# Patient Record
Sex: FEMALE | Race: BLACK OR AFRICAN AMERICAN | NOT HISPANIC OR LATINO | URBAN - METROPOLITAN AREA
[De-identification: names, ages, dates, MRNs, and addresses within clinical notes are randomized per-mention and may not be internally consistent; named-entity substitution may affect disease eponyms.]

---

## 2023-09-05 PROBLEM — E78.5 HYPERLIPIDEMIA: Status: ACTIVE | Noted: 2023-09-05

## 2023-09-05 PROBLEM — E55.9 VITAMIN D DEFICIENCY: Status: ACTIVE | Noted: 2023-09-05

## 2023-09-05 PROBLEM — K21.9 GASTRO-ESOPHAGEAL REFLUX DISEASE WITHOUT ESOPHAGITIS: Status: ACTIVE | Noted: 2023-09-05

## 2023-09-05 PROBLEM — K90.9 MALABSORPTION (HHS-HCC): Status: ACTIVE | Noted: 2023-09-05

## 2023-09-05 PROBLEM — A64 STI (SEXUALLY TRANSMITTED INFECTION): Status: ACTIVE | Noted: 2023-09-05

## 2023-09-05 PROBLEM — M19.90 IDIOPATHIC OSTEOARTHRITIS: Status: ACTIVE | Noted: 2023-09-05

## 2023-09-05 PROBLEM — E11.9 TYPE 2 DIABETES MELLITUS WITHOUT COMPLICATION (MULTI): Status: ACTIVE | Noted: 2023-09-05

## 2023-09-05 PROBLEM — F41.9 ANXIETY: Status: ACTIVE | Noted: 2023-09-05

## 2023-09-05 PROBLEM — I10 PRIMARY HYPERTENSION: Status: ACTIVE | Noted: 2023-09-05

## 2023-09-05 PROBLEM — E66.01 MORBID OBESITY (MULTI): Status: ACTIVE | Noted: 2023-09-05

## 2023-09-05 PROBLEM — G47.33 OBSTRUCTIVE SLEEP APNEA SYNDROME: Status: ACTIVE | Noted: 2023-09-05

## 2023-09-05 PROBLEM — E21.3 HYPERPARATHYROIDISM (MULTI): Status: ACTIVE | Noted: 2023-09-05

## 2023-09-05 PROBLEM — N39.3 URINARY, INCONTINENCE, STRESS FEMALE: Status: ACTIVE | Noted: 2023-09-05

## 2023-09-05 PROBLEM — G47.10 HYPERSOMNIA: Status: ACTIVE | Noted: 2023-09-05

## 2023-09-05 PROBLEM — R32 INCONTINENCE: Status: ACTIVE | Noted: 2023-09-05

## 2023-09-05 RX ORDER — SOLIFENACIN SUCCINATE 5 MG/1
1 TABLET, FILM COATED ORAL DAILY
COMMUNITY
Start: 2021-10-05

## 2023-09-05 RX ORDER — ACETAMINOPHEN 325 MG/1
TABLET ORAL
COMMUNITY
Start: 2021-02-04

## 2023-09-05 RX ORDER — PSEUDOEPHEDRINE HCL 30 MG
TABLET ORAL
COMMUNITY

## 2023-09-05 RX ORDER — OMEPRAZOLE 40 MG/1
CAPSULE, DELAYED RELEASE ORAL
COMMUNITY

## 2023-09-05 RX ORDER — ACETAMINOPHEN 160 MG/5ML
SUSPENSION ORAL
COMMUNITY
Start: 2022-07-07

## 2023-09-05 RX ORDER — URSODIOL 300 MG/1
CAPSULE ORAL
COMMUNITY
Start: 2022-07-07

## 2023-09-05 RX ORDER — ERGOCALCIFEROL 1.25 MG/1
CAPSULE ORAL
COMMUNITY
Start: 2023-01-26

## 2023-09-05 RX ORDER — ADHESIVE BANDAGE
BANDAGE TOPICAL
COMMUNITY

## 2023-09-05 RX ORDER — TROSPIUM CHLORIDE 20 MG/1
1 TABLET, FILM COATED ORAL 2 TIMES DAILY
COMMUNITY
Start: 2021-10-18

## 2023-12-01 ENCOUNTER — ANESTHESIA EVENT (OUTPATIENT)
Dept: GASTROENTEROLOGY | Facility: HOSPITAL | Age: 43
End: 2023-12-01
Payer: COMMERCIAL

## 2023-12-01 ENCOUNTER — HOSPITAL ENCOUNTER (OUTPATIENT)
Dept: GASTROENTEROLOGY | Facility: HOSPITAL | Age: 43
Setting detail: OUTPATIENT SURGERY
Discharge: HOME | End: 2023-12-01
Payer: COMMERCIAL

## 2023-12-01 ENCOUNTER — ANESTHESIA (OUTPATIENT)
Dept: GASTROENTEROLOGY | Facility: HOSPITAL | Age: 43
End: 2023-12-01
Payer: COMMERCIAL

## 2023-12-01 VITALS
HEIGHT: 66 IN | TEMPERATURE: 98.2 F | DIASTOLIC BLOOD PRESSURE: 60 MMHG | OXYGEN SATURATION: 100 % | WEIGHT: 186.95 LBS | HEART RATE: 66 BPM | BODY MASS INDEX: 30.05 KG/M2 | SYSTOLIC BLOOD PRESSURE: 125 MMHG | RESPIRATION RATE: 16 BRPM

## 2023-12-01 DIAGNOSIS — Z12.11 ENCOUNTER FOR SCREENING FOR MALIGNANT NEOPLASM OF COLON: ICD-10-CM

## 2023-12-01 PROBLEM — D64.9 ANEMIA: Status: ACTIVE | Noted: 2023-12-01

## 2023-12-01 LAB — PREGNANCY TEST URINE, POC: NEGATIVE

## 2023-12-01 PROCEDURE — A45378 PR COLONOSCOPY,DIAGNOSTIC: Performed by: ANESTHESIOLOGY

## 2023-12-01 PROCEDURE — 7100000009 HC PHASE TWO TIME - INITIAL BASE CHARGE

## 2023-12-01 PROCEDURE — 45378 DIAGNOSTIC COLONOSCOPY: CPT | Performed by: INTERNAL MEDICINE

## 2023-12-01 PROCEDURE — 2500000004 HC RX 250 GENERAL PHARMACY W/ HCPCS (ALT 636 FOR OP/ED)

## 2023-12-01 PROCEDURE — 3700000001 HC GENERAL ANESTHESIA TIME - INITIAL BASE CHARGE

## 2023-12-01 PROCEDURE — 7100000010 HC PHASE TWO TIME - EACH INCREMENTAL 1 MINUTE

## 2023-12-01 PROCEDURE — 3700000002 HC GENERAL ANESTHESIA TIME - EACH INCREMENTAL 1 MINUTE

## 2023-12-01 PROCEDURE — 2500000005 HC RX 250 GENERAL PHARMACY W/O HCPCS

## 2023-12-01 PROCEDURE — A45378 PR COLONOSCOPY,DIAGNOSTIC

## 2023-12-01 RX ORDER — PROPOFOL 10 MG/ML
INJECTION, EMULSION INTRAVENOUS CONTINUOUS PRN
Status: DISCONTINUED | OUTPATIENT
Start: 2023-12-01 | End: 2023-12-01

## 2023-12-01 RX ORDER — MIDAZOLAM HYDROCHLORIDE 1 MG/ML
INJECTION INTRAMUSCULAR; INTRAVENOUS AS NEEDED
Status: DISCONTINUED | OUTPATIENT
Start: 2023-12-01 | End: 2023-12-01

## 2023-12-01 RX ORDER — GLYCOPYRROLATE 0.2 MG/ML
INJECTION INTRAMUSCULAR; INTRAVENOUS AS NEEDED
Status: DISCONTINUED | OUTPATIENT
Start: 2023-12-01 | End: 2023-12-01

## 2023-12-01 RX ORDER — LIDOCAINE HYDROCHLORIDE 20 MG/ML
INJECTION, SOLUTION EPIDURAL; INFILTRATION; INTRACAUDAL; PERINEURAL AS NEEDED
Status: DISCONTINUED | OUTPATIENT
Start: 2023-12-01 | End: 2023-12-01

## 2023-12-01 RX ORDER — FENTANYL CITRATE 50 UG/ML
INJECTION, SOLUTION INTRAMUSCULAR; INTRAVENOUS AS NEEDED
Status: DISCONTINUED | OUTPATIENT
Start: 2023-12-01 | End: 2023-12-01

## 2023-12-01 RX ADMIN — SODIUM CHLORIDE, SODIUM LACTATE, POTASSIUM CHLORIDE, AND CALCIUM CHLORIDE: 600; 310; 30; 20 INJECTION, SOLUTION INTRAVENOUS at 12:56

## 2023-12-01 RX ADMIN — FENTANYL CITRATE 25 MCG: 50 INJECTION, SOLUTION INTRAMUSCULAR; INTRAVENOUS at 13:04

## 2023-12-01 RX ADMIN — MIDAZOLAM HYDROCHLORIDE 2 MG: 1 INJECTION, SOLUTION INTRAMUSCULAR; INTRAVENOUS at 12:56

## 2023-12-01 RX ADMIN — LIDOCAINE HYDROCHLORIDE 60 MG: 20 INJECTION, SOLUTION EPIDURAL; INFILTRATION; INTRACAUDAL; PERINEURAL at 12:59

## 2023-12-01 RX ADMIN — PROPOFOL 100 MCG/KG/MIN: 10 INJECTION, EMULSION INTRAVENOUS at 13:00

## 2023-12-01 RX ADMIN — GLYCOPYRROLATE 0.2 MG: 0.2 INJECTION INTRAMUSCULAR; INTRAVENOUS at 13:10

## 2023-12-01 RX ADMIN — FENTANYL CITRATE 25 MCG: 50 INJECTION, SOLUTION INTRAMUSCULAR; INTRAVENOUS at 13:00

## 2023-12-01 ASSESSMENT — PAIN SCALES - GENERAL
PAINLEVEL_OUTOF10: 0 - NO PAIN
PAIN_LEVEL: 0
PAINLEVEL_OUTOF10: 0 - NO PAIN

## 2023-12-01 ASSESSMENT — COLUMBIA-SUICIDE SEVERITY RATING SCALE - C-SSRS
2. HAVE YOU ACTUALLY HAD ANY THOUGHTS OF KILLING YOURSELF?: NO
6. HAVE YOU EVER DONE ANYTHING, STARTED TO DO ANYTHING, OR PREPARED TO DO ANYTHING TO END YOUR LIFE?: NO
1. IN THE PAST MONTH, HAVE YOU WISHED YOU WERE DEAD OR WISHED YOU COULD GO TO SLEEP AND NOT WAKE UP?: NO

## 2023-12-01 ASSESSMENT — PAIN - FUNCTIONAL ASSESSMENT: PAIN_FUNCTIONAL_ASSESSMENT: 0-10

## 2023-12-01 NOTE — ANESTHESIA POSTPROCEDURE EVALUATION
Patient: iNcole Garcia    Procedure Summary       Date: 12/01/23 Room / Location: [facility]    Anesthesia Start: 1256 Anesthesia Stop: 1334    Procedure: COLONOSCOPY Diagnosis: Encounter for screening for malignant neoplasm of colon    Scheduled Providers: Elvia Marino MD; Ciro Ivory MD; CECILIO Juarez; Jarad Thomas RN; June Goins MA Responsible Provider: Ciro Ivory MD    Anesthesia Type: MAC ASA Status: 2            Anesthesia Type: MAC    Vitals Value Taken Time   /53 12/01/23 1330   Temp 36.8 °C (98.2 °F) 12/01/23 1330   Pulse 64 12/01/23 1330   Resp 16 12/01/23 1330   SpO2 100 % 12/01/23 1345       Anesthesia Post Evaluation    Patient location during evaluation: bedside  Patient participation: complete - patient cannot participate  Level of consciousness: awake  Pain score: 0  Pain management: adequate  Airway patency: patent  Cardiovascular status: acceptable  Respiratory status: acceptable  Hydration status: acceptable  Postoperative Nausea and Vomiting: none        There were no known notable events for this encounter.     Right arm;

## 2023-12-01 NOTE — ANESTHESIA PREPROCEDURE EVALUATION
Patient: Nicole Garcia    Procedure Information       Date/Time: 12/01/23 1250    Scheduled providers: Elvia Marino MD; Ciro Ivory MD; CECILIO Juarez; Jarad Thomas RN; June Goins MA    Procedure: COLONOSCOPY    Location: [facility]            Relevant Problems   Anesthesia (within normal limits)      Cardiovascular   (+) Hyperlipidemia   (+) Primary hypertension      Endocrine   (+) Hyperparathyroidism (CMS/HCC)   (+) Morbid obesity (CMS/HCC)   (+) Type 2 diabetes mellitus without complication (CMS/HCC)      GI   (+) Gastro-esophageal reflux disease without esophagitis      Neuro/Psych   (+) Anxiety      Pulmonary   (+) Obstructive sleep apnea syndrome      Hematology   (+) Anemia (Slight)      Musculoskeletal   (+) Idiopathic osteoarthritis      Infectious Disease   (+) STI (sexually transmitted infection)       Clinical information reviewed:   Tobacco  Allergies  Meds   Med Hx  Surg Hx   Fam Hx  Soc Hx        NPO Detail:  NPO/Void Status  Carbonhydrate Drink Given Prior to Surgery? : N  Date of Last Liquid: 12/01/23  Time of Last Liquid: 0830  Date of Last Solid: 11/29/23  Time of Last Solid: 1800  Last Intake Type: Clear fluids; GI prep (water)  Time of Last Void: 1215         Physical Exam    Airway  Mallampati: I     Cardiovascular    Dental    Pulmonary    Abdominal            Anesthesia Plan    ASA 2     MAC     intravenous induction   Anesthetic plan and risks discussed with patient.

## 2023-12-01 NOTE — SIGNIFICANT EVENT
Pt received from procedure .   Placed on continuous cardiac // sao2 monitor.  Awake.   to the bedside.   Pt eating and drinking without nausea or vomiting

## 2023-12-04 ASSESSMENT — PAIN SCALES - GENERAL: PAINLEVEL_OUTOF10: 0 - NO PAIN

## 2023-12-04 NOTE — ADDENDUM NOTE
Encounter addended by: Keiko Causey RN on: 12/4/2023 8:37 AM   Actions taken: Contacts section saved, Flowsheet accepted

## 2024-05-01 DIAGNOSIS — E55.9 VITAMIN D DEFICIENCY: ICD-10-CM

## 2024-05-01 DIAGNOSIS — E53.8 VITAMIN B 12 DEFICIENCY: ICD-10-CM

## 2024-05-01 DIAGNOSIS — K90.9 INTESTINAL MALABSORPTION, UNSPECIFIED TYPE (HHS-HCC): ICD-10-CM

## 2024-05-25 ENCOUNTER — LAB (OUTPATIENT)
Dept: LAB | Facility: LAB | Age: 44
End: 2024-05-25
Payer: COMMERCIAL

## 2024-05-25 DIAGNOSIS — K90.9 INTESTINAL MALABSORPTION, UNSPECIFIED TYPE (HHS-HCC): ICD-10-CM

## 2024-05-25 DIAGNOSIS — E53.8 VITAMIN B 12 DEFICIENCY: ICD-10-CM

## 2024-05-25 DIAGNOSIS — E55.9 VITAMIN D DEFICIENCY: ICD-10-CM

## 2024-05-25 LAB
25(OH)D3 SERPL-MCNC: 23 NG/ML (ref 31–100)
ALBUMIN SERPL-MCNC: 4.2 G/DL (ref 3.5–5)
ALP BLD-CCNC: 58 U/L (ref 35–125)
ALT SERPL-CCNC: 15 U/L (ref 5–40)
ANION GAP SERPL CALC-SCNC: 12 MMOL/L
AST SERPL-CCNC: 17 U/L (ref 5–40)
BASOPHILS # BLD AUTO: 0.05 X10*3/UL (ref 0–0.1)
BASOPHILS NFR BLD AUTO: 1.1 %
BILIRUB SERPL-MCNC: 1.2 MG/DL (ref 0.1–1.2)
BUN SERPL-MCNC: 15 MG/DL (ref 8–25)
CALCIUM SERPL-MCNC: 8.9 MG/DL (ref 8.5–10.4)
CHLORIDE SERPL-SCNC: 106 MMOL/L (ref 97–107)
CO2 SERPL-SCNC: 25 MMOL/L (ref 24–31)
CREAT SERPL-MCNC: 1 MG/DL (ref 0.4–1.6)
EGFRCR SERPLBLD CKD-EPI 2021: 71 ML/MIN/1.73M*2
EOSINOPHIL # BLD AUTO: 0.25 X10*3/UL (ref 0–0.7)
EOSINOPHIL NFR BLD AUTO: 5.7 %
ERYTHROCYTE [DISTWIDTH] IN BLOOD BY AUTOMATED COUNT: 14 % (ref 11.5–14.5)
FERRITIN SERPL-MCNC: 170 NG/ML (ref 13–150)
FOLATE SERPL-MCNC: >20 NG/ML (ref 4.2–19.9)
GLUCOSE SERPL-MCNC: 87 MG/DL (ref 65–99)
HCT VFR BLD AUTO: 35.5 % (ref 36–46)
HGB BLD-MCNC: 11.4 G/DL (ref 12–16)
IMM GRANULOCYTES # BLD AUTO: 0.01 X10*3/UL (ref 0–0.7)
IMM GRANULOCYTES NFR BLD AUTO: 0.2 % (ref 0–0.9)
IRON SATN MFR SERPL: 40 % (ref 12–50)
IRON SERPL-MCNC: 85 UG/DL (ref 30–160)
LYMPHOCYTES # BLD AUTO: 1.76 X10*3/UL (ref 1.2–4.8)
LYMPHOCYTES NFR BLD AUTO: 40.5 %
MCH RBC QN AUTO: 27.5 PG (ref 26–34)
MCHC RBC AUTO-ENTMCNC: 32.1 G/DL (ref 32–36)
MCV RBC AUTO: 86 FL (ref 80–100)
MONOCYTES # BLD AUTO: 0.41 X10*3/UL (ref 0.1–1)
MONOCYTES NFR BLD AUTO: 9.4 %
NEUTROPHILS # BLD AUTO: 1.87 X10*3/UL (ref 1.2–7.7)
NEUTROPHILS NFR BLD AUTO: 43.1 %
NRBC BLD-RTO: 0 /100 WBCS (ref 0–0)
PLATELET # BLD AUTO: 280 X10*3/UL (ref 150–450)
POTASSIUM SERPL-SCNC: 4.3 MMOL/L (ref 3.4–5.1)
PROT SERPL-MCNC: 6.9 G/DL (ref 5.9–7.9)
RBC # BLD AUTO: 4.15 X10*6/UL (ref 4–5.2)
SODIUM SERPL-SCNC: 143 MMOL/L (ref 133–145)
TIBC SERPL-MCNC: 212 UG/DL (ref 228–428)
UIBC SERPL-MCNC: 127 UG/DL (ref 110–370)
VIT B12 SERPL-MCNC: 1458 PG/ML (ref 211–946)
WBC # BLD AUTO: 4.4 X10*3/UL (ref 4.4–11.3)

## 2024-05-25 PROCEDURE — 82607 VITAMIN B-12: CPT

## 2024-05-25 PROCEDURE — 36415 COLL VENOUS BLD VENIPUNCTURE: CPT

## 2024-05-25 PROCEDURE — 84630 ASSAY OF ZINC: CPT

## 2024-05-25 PROCEDURE — 83970 ASSAY OF PARATHORMONE: CPT

## 2024-05-25 PROCEDURE — 82728 ASSAY OF FERRITIN: CPT

## 2024-05-25 PROCEDURE — 83550 IRON BINDING TEST: CPT

## 2024-05-25 PROCEDURE — 82525 ASSAY OF COPPER: CPT

## 2024-05-25 PROCEDURE — 82306 VITAMIN D 25 HYDROXY: CPT

## 2024-05-25 PROCEDURE — 85025 COMPLETE CBC W/AUTO DIFF WBC: CPT

## 2024-05-25 PROCEDURE — 80053 COMPREHEN METABOLIC PANEL: CPT

## 2024-05-25 PROCEDURE — 83540 ASSAY OF IRON: CPT

## 2024-05-25 PROCEDURE — 82746 ASSAY OF FOLIC ACID SERUM: CPT

## 2024-05-25 PROCEDURE — 84425 ASSAY OF VITAMIN B-1: CPT

## 2024-05-26 LAB — PTH-INTACT SERPL-MCNC: 54.1 PG/ML (ref 18.5–88)

## 2024-05-29 LAB
COPPER SERPL-MCNC: 93.2 UG/DL (ref 80–155)
ZINC SERPL-MCNC: 72.9 UG/DL (ref 60–120)

## 2024-05-31 LAB — VIT B1 PYROPHOSHATE BLD-SCNC: 69 NMOL/L (ref 70–180)

## 2024-06-05 NOTE — PROGRESS NOTES
Subjective   Patient ID: Nicole Garcia is a 43 y.o. female who presents for No chief complaint on file..  HPI  2 YR FUV SLEEVE  HT: 64.5 IN START WT:284 IDEAL WT: 145 START EXCESS:139   Review of Systems  Bariatric Surgery F/U:          Seen at ER after surgery? No.  Hospital admission? No.  Bariatric reoperation? No.  Bariatric intervention? No.  Was anticoagulation initiated for presumed/confirmed Vein Thrombosis/PE? No.  Was an incisional hernia noted on exam? No.  Sleep Apnea? No.  Still using C-PAP? No.  GERD req. meds? No.  Hyperlipidemia? No.  Still taking Cholesterol med? No.  Hypertension? No.  Still taking HTN med? No.  Number of Anti-hypertensive Medications: 0.  Diabetes? No.  Still taking DM med? No.  Current DM medication type: _____.         CONSTITUTIONAL:          Chills No.  Fatigue No.  Fever No.         CARDIOLOGY:          Negative for dizziness, chest pain, palpitations, shortness of breath.         RESPIRATORY:          Negative for chest congestion, cough, wheezing.         GASTROENTEROLOGY:          Food Intolerance No.  Acid reflux No.  Abdominal pain No.  Black stools No.  Constipation No.  Diarrhea No.  Loss of appetite no.  Nausea No.  Vomiting No.         UROLOGY:          Negative for dysuria, urinary urgency, kidney stones.         PSYCHOLOGY:          Negative for depression, anxiety, high stress level.   Objective   Physical Exam    Assessment/Plan            Yuni Herman LPN 06/05/24 4:14 PM

## 2024-06-11 ENCOUNTER — OFFICE VISIT (OUTPATIENT)
Dept: SURGERY | Facility: CLINIC | Age: 44
End: 2024-06-11
Payer: COMMERCIAL

## 2024-06-11 VITALS
DIASTOLIC BLOOD PRESSURE: 80 MMHG | BODY MASS INDEX: 30.16 KG/M2 | WEIGHT: 181 LBS | HEART RATE: 73 BPM | SYSTOLIC BLOOD PRESSURE: 134 MMHG | HEIGHT: 65 IN

## 2024-06-11 DIAGNOSIS — E21.3 HYPERPARATHYROIDISM (MULTI): ICD-10-CM

## 2024-06-11 DIAGNOSIS — K90.9 INTESTINAL MALABSORPTION, UNSPECIFIED TYPE (HHS-HCC): Primary | ICD-10-CM

## 2024-06-11 DIAGNOSIS — E51.9 THIAMINE DEFICIENCY: ICD-10-CM

## 2024-06-11 DIAGNOSIS — E55.9 VITAMIN D DEFICIENCY: ICD-10-CM

## 2024-06-11 DIAGNOSIS — Z98.84 H/O BARIATRIC SURGERY: ICD-10-CM

## 2024-06-11 DIAGNOSIS — E53.8 VITAMIN B12 DEFICIENCY: ICD-10-CM

## 2024-06-11 PROCEDURE — 1036F TOBACCO NON-USER: CPT

## 2024-06-11 PROCEDURE — 3079F DIAST BP 80-89 MM HG: CPT

## 2024-06-11 PROCEDURE — 99213 OFFICE O/P EST LOW 20 MIN: CPT

## 2024-06-11 PROCEDURE — 3075F SYST BP GE 130 - 139MM HG: CPT

## 2024-06-11 RX ORDER — ERGOCALCIFEROL 1.25 MG/1
1.25 CAPSULE ORAL
Qty: 12 CAPSULE | Refills: 3 | Status: SHIPPED | OUTPATIENT
Start: 2024-06-16 | End: 2025-06-16

## 2024-06-11 RX ORDER — CYANOCOBALAMIN (VITAMIN B-12) 250 MCG
1000 TABLET ORAL DAILY
COMMUNITY

## 2024-06-11 RX ORDER — FERROUS SULFATE 325(65) MG
325 TABLET ORAL
COMMUNITY

## 2024-06-11 RX ORDER — LANOLIN ALCOHOL/MO/W.PET/CERES
100 CREAM (GRAM) TOPICAL DAILY
Qty: 90 TABLET | Refills: 3 | Status: SHIPPED | OUTPATIENT
Start: 2024-06-11 | End: 2025-06-11

## 2024-06-11 RX ORDER — BISMUTH SUBSALICYLATE 262 MG
1 TABLET,CHEWABLE ORAL DAILY
COMMUNITY

## 2024-06-11 ASSESSMENT — COLUMBIA-SUICIDE SEVERITY RATING SCALE - C-SSRS
6. HAVE YOU EVER DONE ANYTHING, STARTED TO DO ANYTHING, OR PREPARED TO DO ANYTHING TO END YOUR LIFE?: NO
1. IN THE PAST MONTH, HAVE YOU WISHED YOU WERE DEAD OR WISHED YOU COULD GO TO SLEEP AND NOT WAKE UP?: NO
2. HAVE YOU ACTUALLY HAD ANY THOUGHTS OF KILLING YOURSELF?: NO

## 2024-06-11 ASSESSMENT — PATIENT HEALTH QUESTIONNAIRE - PHQ9
SUM OF ALL RESPONSES TO PHQ9 QUESTIONS 1 AND 2: 0
2. FEELING DOWN, DEPRESSED OR HOPELESS: NOT AT ALL
1. LITTLE INTEREST OR PLEASURE IN DOING THINGS: NOT AT ALL

## 2024-06-11 ASSESSMENT — PAIN SCALES - GENERAL: PAINLEVEL: 0-NO PAIN

## 2024-06-11 NOTE — PROGRESS NOTES
Subjective   Patient ID: Nicole Garcia is a 43 y.o. female who presents for Follow-up (2 YR FUV SLEEVE).  PRAVEEN Rivera is here for her 2 year follow up after a VSG. She has lost 74% of her excess weight. She denies reflux. Some days she does feel hunger. She tells me that her meal volume is still small. She thinks 1c. She is doing a Zoom bootcamp class 3 days per week. She is walking 2-3 days per week. She is currently taking celebrate 45, calcium citrate once, megafood blood builder, collagen, and b12 sublingual. We reviewed her labwork.      Objective   Physical Exam  Constitutional:       Appearance: Normal appearance.   Eyes:      Pupils: Pupils are equal, round, and reactive to light.   Cardiovascular:      Rate and Rhythm: Normal rate.   Pulmonary:      Effort: Pulmonary effort is normal.   Abdominal:      Palpations: Abdomen is soft.   Musculoskeletal:         General: Normal range of motion.   Skin:     General: Skin is warm and dry.   Neurological:      General: No focal deficit present.      Mental Status: She is alert and oriented to person, place, and time.   Psychiatric:         Mood and Affect: Mood normal.        Latest Reference Range & Units 05/25/24 08:59   GLUCOSE 65 - 99 mg/dL 87   SODIUM 133 - 145 mmol/L 143   POTASSIUM 3.4 - 5.1 mmol/L 4.3   CHLORIDE 97 - 107 mmol/L 106   Bicarbonate 24 - 31 mmol/L 25   Anion Gap <=19 mmol/L 12   Blood Urea Nitrogen 8 - 25 mg/dL 15   Creatinine 0.40 - 1.60 mg/dL 1.00   EGFR >60 mL/min/1.73m*2 71   Calcium 8.5 - 10.4 mg/dL 8.9   Albumin 3.5 - 5.0 g/dL 4.2   Alkaline Phosphatase 35 - 125 U/L 58   ALT 5 - 40 U/L 15   AST 5 - 40 U/L 17   Bilirubin Total 0.1 - 1.2 mg/dL 1.2   FERRITIN 13 - 150 ng/mL 170 (H)   FOLATE 4.2 - 19.9 ng/mL >20.0 (H)   Total Protein 5.9 - 7.9 g/dL 6.9   IRON 30 - 160 ug/dL 85   TIBC 228 - 428 ug/dL 212 (L)   UIBC 110 - 370 ug/dL 127   % Saturation 12 - 50 % 40   Vitamin B12 211 - 946 pg/mL 1,458 (H)   Copper 80.0 - 155.0 ug/dL 93.2    Zinc, Serum or Plasma 60.0 - 120.0 ug/dL 72.9   Vitamin B1, Whole Blood 70 - 180 nmol/L 69 (L)   Parathyroid Hormone, Intact 18.5 - 88.0 pg/mL 54.1   Vitamin D, 25-Hydroxy, Total 31 - 100 ng/mL 23 (L)   LEUKOCYTES (10*3/UL) IN BLOOD BY AUTOMATED COUNT, Swedish 4.4 - 11.3 x10*3/uL 4.4   nRBC 0.0 - 0.0 /100 WBCs 0.0   ERYTHROCYTES (10*6/UL) IN BLOOD BY AUTOMATED COUNT, Swedish 4.00 - 5.20 x10*6/uL 4.15   HEMOGLOBIN 12.0 - 16.0 g/dL 11.4 (L)   HEMATOCRIT 36.0 - 46.0 % 35.5 (L)   MCV 80 - 100 fL 86   MCH 26.0 - 34.0 pg 27.5   MCHC 32.0 - 36.0 g/dL 32.1   RED CELL DISTRIBUTION WIDTH 11.5 - 14.5 % 14.0   PLATELETS (10*3/UL) IN BLOOD AUTOMATED COUNT, Swedish 150 - 450 x10*3/uL 280   NEUTROPHILS/100 LEUKOCYTES IN BLOOD BY AUTOMATED COUNT, Swedish 40.0 - 80.0 % 43.1   Immature Granulocytes %, Automated 0.0 - 0.9 % 0.2   Lymphocytes % 13.0 - 44.0 % 40.5   Monocytes % 2.0 - 10.0 % 9.4   Eosinophils % 0.0 - 6.0 % 5.7   Basophils % 0.0 - 2.0 % 1.1   NEUTROPHILS (10*3/UL) IN BLOOD BY AUTOMATED COUNT, Swedish 1.20 - 7.70 x10*3/uL 1.87   Immature Granulocytes Absolute, Automated 0.00 - 0.70 x10*3/uL 0.01   Lymphocytes Absolute 1.20 - 4.80 x10*3/uL 1.76   Monocytes Absolute 0.10 - 1.00 x10*3/uL 0.41   Eosinophils Absolute 0.00 - 0.70 x10*3/uL 0.25   Basophils Absolute 0.00 - 0.10 x10*3/uL 0.05     Assessment/Plan   Problem List Items Addressed This Visit             ICD-10-CM    Hyperparathyroidism (Multi) E21.3    Malabsorption (HHS-HCC) - Primary K90.9    Vitamin D deficiency E55.9    Relevant Medications    ergocalciferol (DrisdoL) 1.25 MG (66370 UT) capsule (Start on 6/16/2024)    Other Relevant Orders    Vitamin D 25-Hydroxy,Total (for eval of Vitamin D levels)    Thiamine deficiency E51.9    Relevant Medications    thiamine (Vitamin B-1) 100 mg tablet    Other Relevant Orders    Vitamin B1, Whole Blood    Vitamin B12 deficiency E53.8    H/O bariatric surgery Z98.84       Nicole is doing well with her weight loss. I  encouraged her to continue to follow the rules and be physically active for continued weight loss/maintenance. Nicole will add a daily thiamine and weekly vitamin D. We will recheck her levels in 6 months.     CHANG Banks-CNP 06/11/24 9:59 AM

## 2024-06-12 ENCOUNTER — TELEPHONE (OUTPATIENT)
Dept: SURGERY | Facility: CLINIC | Age: 44
End: 2024-06-12
Payer: COMMERCIAL

## 2024-06-12 NOTE — TELEPHONE ENCOUNTER
Bariatric Post-Op Follow Up Appointment: 2 yr VSG     Current Weight: 181 lb (wt taken in the office on 6/11/24)   Current BMI: 30.59  Percentage of weight loss achieved: 74     Dietary Intake: lifelong rules   Breakfast- greek yogurt with berries, OR Pollok protein waffles with berries, OR homemade protein pancakes   Lunch- grilled chicken with vegetables, OR a salad, OR a protein shake  Dinner- a taco salad bowl with cottage cheese, OR red meat on occasion   Volume of food at a time: not addressed   Snacks: apples and peanut butter with [employer]isa's chocolate chips, [employer] chips   Beverages: water, crystal light, protein shake on occasion   Alcohol Intake: occasionally, no more than 1 drink per the occasion.   Do you drink with your meals? Minimal sip on occasion   Current Exercise: a zoom workout class 3x/week plus walking   Vitamins/minerals: [id number] MVI 1x/day, calcium citrate 1x/day, Vitamin B12, additional iron supplement   Food intolerances? None   Any decrease in energy levels? No   Any questions or concerns? No     Nicole will begin to take an additional vitamin D and vitamin B1 supplement and will follow up in 6 mos.       Cassi Zelaya RD, LDN  Registered Dietitian, Licensed Dietitian Nutritionist